# Patient Record
Sex: FEMALE | Race: WHITE
[De-identification: names, ages, dates, MRNs, and addresses within clinical notes are randomized per-mention and may not be internally consistent; named-entity substitution may affect disease eponyms.]

---

## 2018-10-22 ENCOUNTER — HOSPITAL ENCOUNTER (INPATIENT)
Dept: HOSPITAL 26 - MED | Age: 33
LOS: 1 days | Discharge: HOME | DRG: 194 | End: 2018-10-23
Attending: INTERNAL MEDICINE | Admitting: INTERNAL MEDICINE
Payer: COMMERCIAL

## 2018-10-22 VITALS
HEIGHT: 60 IN | DIASTOLIC BLOOD PRESSURE: 121 MMHG | WEIGHT: 112 LBS | BODY MASS INDEX: 21.99 KG/M2 | SYSTOLIC BLOOD PRESSURE: 201 MMHG

## 2018-10-22 VITALS — SYSTOLIC BLOOD PRESSURE: 123 MMHG | DIASTOLIC BLOOD PRESSURE: 78 MMHG

## 2018-10-22 VITALS — DIASTOLIC BLOOD PRESSURE: 113 MMHG | SYSTOLIC BLOOD PRESSURE: 159 MMHG

## 2018-10-22 VITALS — DIASTOLIC BLOOD PRESSURE: 122 MMHG | SYSTOLIC BLOOD PRESSURE: 189 MMHG

## 2018-10-22 VITALS — SYSTOLIC BLOOD PRESSURE: 189 MMHG | DIASTOLIC BLOOD PRESSURE: 112 MMHG

## 2018-10-22 DIAGNOSIS — I50.43: ICD-10-CM

## 2018-10-22 DIAGNOSIS — I07.1: ICD-10-CM

## 2018-10-22 DIAGNOSIS — Z98.51: ICD-10-CM

## 2018-10-22 DIAGNOSIS — I13.2: Primary | ICD-10-CM

## 2018-10-22 DIAGNOSIS — K21.9: ICD-10-CM

## 2018-10-22 DIAGNOSIS — E87.5: ICD-10-CM

## 2018-10-22 DIAGNOSIS — I16.0: ICD-10-CM

## 2018-10-22 DIAGNOSIS — I34.0: ICD-10-CM

## 2018-10-22 DIAGNOSIS — Z82.49: ICD-10-CM

## 2018-10-22 DIAGNOSIS — N18.6: ICD-10-CM

## 2018-10-22 DIAGNOSIS — D63.8: ICD-10-CM

## 2018-10-22 DIAGNOSIS — Z99.2: ICD-10-CM

## 2018-10-22 LAB
ALBUMIN FLD-MCNC: 3.9 G/DL (ref 3.4–5)
ANION GAP SERPL CALCULATED.3IONS-SCNC: 14.7 MMOL/L (ref 8–16)
AST SERPL-CCNC: 22 U/L (ref 15–37)
BASOPHILS # BLD AUTO: 0.1 K/UL (ref 0–0.22)
BASOPHILS NFR BLD AUTO: 1.5 % (ref 0–2)
BILIRUB SERPL-MCNC: 0.5 MG/DL (ref 0–1)
BUN SERPL-MCNC: 93 MG/DL (ref 7–18)
CHLORIDE SERPL-SCNC: 98 MMOL/L (ref 98–107)
CO2 SERPL-SCNC: 30.8 MMOL/L (ref 21–32)
CREAT SERPL-MCNC: 13.6 MG/DL (ref 0.6–1.3)
EOSINOPHIL # BLD AUTO: 0.2 K/UL (ref 0–0.4)
EOSINOPHIL NFR BLD AUTO: 2.2 % (ref 0–4)
ERYTHROCYTE [DISTWIDTH] IN BLOOD BY AUTOMATED COUNT: 16.2 % (ref 11.6–13.7)
GFR SERPL CREATININE-BSD FRML MDRD: 4 ML/MIN (ref 90–?)
GLUCOSE SERPL-MCNC: 94 MG/DL (ref 74–106)
HCT VFR BLD AUTO: 34 % (ref 36–48)
HGB BLD-MCNC: 11.1 G/DL (ref 12–16)
IRON SERPL-MCNC: 49 UG/DL (ref 35–150)
LYMPHOCYTES # BLD AUTO: 2 K/UL (ref 2.5–16.5)
LYMPHOCYTES NFR BLD AUTO: 28.8 % (ref 20.5–51.1)
MCH RBC QN AUTO: 31 PG (ref 27–31)
MCHC RBC AUTO-ENTMCNC: 33 G/DL (ref 33–37)
MCV RBC AUTO: 95.3 FL (ref 80–94)
MONOCYTES # BLD AUTO: 0.3 K/UL (ref 0.8–1)
MONOCYTES NFR BLD AUTO: 4.4 % (ref 1.7–9.3)
NEUTROPHILS # BLD AUTO: 4.4 K/UL (ref 1.8–7.7)
NEUTROPHILS NFR BLD AUTO: 63.1 % (ref 42.2–75.2)
PLATELET # BLD AUTO: 144 K/UL (ref 140–450)
POTASSIUM SERPL-SCNC: 5.5 MMOL/L (ref 3.5–5.1)
PROTHROMBIN TIME: 10.6 SECS (ref 10.8–13.4)
RBC # BLD AUTO: 3.57 MIL/UL (ref 4.2–5.4)
SODIUM SERPL-SCNC: 138 MMOL/L (ref 136–145)
TIBC SERPL-MCNC: 158 UG/DL (ref 250–450)
WBC # BLD AUTO: 7 K/UL (ref 4.8–10.8)

## 2018-10-22 RX ADMIN — SODIUM CHLORIDE PRN MG: 9 INJECTION, SOLUTION INTRAVENOUS at 11:39

## 2018-10-22 RX ADMIN — SODIUM CHLORIDE PRN MG: 9 INJECTION, SOLUTION INTRAVENOUS at 11:44

## 2018-10-22 NOTE — NUR
PATIENT CONTINUED ON HD. NO ACUTE DISTRESS NOTED.

-------------------------------------------------------------------------------

Addendum: 10/22/18 at 1928 by Gabino Dias RN

-------------------------------------------------------------------------------

BP NOTED 109/73. WNL.

## 2018-10-22 NOTE — NUR
PATIENT HAVING INCREASED AGITATION WITH ANXIETY. MEDICATED AS ORDERED WITH ATIVAN IVP. 
PATIENT BP TAKEN AT BEDSIDE 185/121, PER HD NURSE, OKAY TO GIVE ORDERED MEDICATION AT THIS 
TIME. MEDICATED AS ORDERED.

## 2018-10-22 NOTE — NUR
RECEIVED SBAR REPORT FROM ER NURSE AT PT BEDSIDE. PATIENT AMBULATORY TO BED. ALERT AND 
ORIENTED, Maltese SPEAKING. C/O HEADACHE AND CHEST PAIN, MEDICATED BY ER NURSE, PATIENT 
STATES REDUCED PAIN. PATIENT HAS HX ESRD, AV SHUNT LEFT ARM. ON ROOM AIR, NO ACUTE 
RESPIRATORY DISTRESS NOTED. BP ON ARRIVAL 189/112, PAGE SENT TO DR. BARTLETT TO CLARIFY PRN 
ORDERS. PATIENT ORIENTED TO HOSPITAL ENVIRONMENT. CALL LIGHT WITHIN REACH. UPDATED ON 
CURRENT PLAN OF CARE, IN AGREEMENT. IV SITE PATENT AND INTACT. TO F/U MEDICATIONS.

## 2018-10-22 NOTE — NUR
DR. BARTLETT MADE AWARE CURRENT /112, RECEIVED PRN HYDRALAZINE PO, MEDICATED AS ORDERED. 
TO FOLLOW.

## 2018-10-22 NOTE — NUR
33[facility] PT C/O 2/10 CHEST PAIN/HEAVINESS, X1 HR. WAS NOT ABLE TO 
HAVE HD THIS AM. PT WAS GIVEN 0.4MG NITRO SL AND 325MG ASPIRIN PO WITH RELIEF, 
NSR ON SCENE. PT REPORTS PRODUCTIVE COUGH. PT DENIES FEVER, SOB, N/V. LUNG 
SOUNDS CLEAR BL. BS ACTIVE X4, ABD SOFT FLAT NONTENDER. AOX4, AMBULATORY, RR 
EVEN AND UNLABORED. L AV FISTULA NOTED, BRUIT AND THRILL NOTED. PT REPORTS 
BEING ABLE TO MAKE URINE. 

HX HTN, ESRD (MWF)

-------------------------------------------------------------------------------

Addendum: 10/22/18 at 0516 by GLORIA

-------------------------------------------------------------------------------

PT REPORTS 9/10 HEADACHE

## 2018-10-22 NOTE — NUR
PATIENT HAS BEEN SCREENED AND CATEGORIZED AS MODERATE NUTRITION RISK. PATIENT WILL BE SEEN 
WITHIN 3-5 DAYS OF ADMISSION.



10/24/18  10/26/18



SAMM [address]ONT[address]

## 2018-10-22 NOTE — NUR
RECEIVED PT FROM DEANGELO DIANA RN PT [place]ao [place]ome[place] SPEAKER AAOX4 AMBULATORY  S/P HD 3,300 ML OUT ON 
TELEMETRY SR DENIES ANY PAIN AT THIS TIME INITIAL ASSESSMENT DONE

## 2018-10-22 NOTE — NUR
PATIENT SEEN BY DR. BARTLETT AT BEDSIDE. MD MADE AWARE OF PATIENT'S HEADACHES AND NAUSEA WITH 
VOMITING, UPDATED TO CURRENT /120. ORDERS RECEIVED IVP PRN BP MEDICATIONS, CT HEAD 
ORDERED. TO FOLLOW.

## 2018-10-22 NOTE — NUR
PATIENT SEEN BY DR. LIANG AT BEDSIDE. HD NURSE AT BEDSIDE. PATIENT TO HAVE HD. PER MD 
ORDERS, MEDICATE FOR ELEVATED BP DURING HD. TO FOLLOW.

## 2018-10-22 NOTE — NUR
CONTINUED ON HD, PATIENT RESTING IN BED. NO ACUTE DISTRESS NOTED. PATIENT HAVING BP WITHIN 
NORMAL LIMITS AT THIS TIME.

## 2018-10-22 NOTE — NUR
Patient will be admitted to care of DR. BARTLETT. Admited to TELE.  Will go to 
room 114. Belongings list completed.  Report to PAYAL BRIGHT.

## 2018-10-23 VITALS — SYSTOLIC BLOOD PRESSURE: 119 MMHG | DIASTOLIC BLOOD PRESSURE: 78 MMHG

## 2018-10-23 VITALS — SYSTOLIC BLOOD PRESSURE: 142 MMHG | DIASTOLIC BLOOD PRESSURE: 89 MMHG

## 2018-10-23 VITALS — SYSTOLIC BLOOD PRESSURE: 151 MMHG | DIASTOLIC BLOOD PRESSURE: 94 MMHG

## 2018-10-23 VITALS — SYSTOLIC BLOOD PRESSURE: 136 MMHG | DIASTOLIC BLOOD PRESSURE: 83 MMHG

## 2018-10-23 VITALS — SYSTOLIC BLOOD PRESSURE: 133 MMHG | DIASTOLIC BLOOD PRESSURE: 87 MMHG

## 2018-10-23 LAB
ALBUMIN FLD-MCNC: 3.7 G/DL (ref 3.4–5)
ANION GAP SERPL CALCULATED.3IONS-SCNC: 7.6 MMOL/L (ref 8–16)
AST SERPL-CCNC: 14 U/L (ref 15–37)
BASOPHILS # BLD AUTO: 0 K/UL (ref 0–0.22)
BASOPHILS NFR BLD AUTO: 0.4 % (ref 0–2)
BILIRUB SERPL-MCNC: 0.5 MG/DL (ref 0–1)
BUN SERPL-MCNC: 36 MG/DL (ref 7–18)
CHLORIDE SERPL-SCNC: 104 MMOL/L (ref 98–107)
CO2 SERPL-SCNC: 29 MMOL/L (ref 21–32)
CREAT SERPL-MCNC: 8.5 MG/DL (ref 0.6–1.3)
EOSINOPHIL # BLD AUTO: 0.1 K/UL (ref 0–0.4)
EOSINOPHIL NFR BLD AUTO: 0.7 % (ref 0–4)
ERYTHROCYTE [DISTWIDTH] IN BLOOD BY AUTOMATED COUNT: 16.2 % (ref 11.6–13.7)
GFR SERPL CREATININE-BSD FRML MDRD: 7 ML/MIN (ref 90–?)
GLUCOSE SERPL-MCNC: 95 MG/DL (ref 74–106)
HCT VFR BLD AUTO: 30.7 % (ref 36–48)
HGB BLD-MCNC: 10.2 G/DL (ref 12–16)
LYMPHOCYTES # BLD AUTO: 1.8 K/UL (ref 2.5–16.5)
LYMPHOCYTES NFR BLD AUTO: 21.3 % (ref 20.5–51.1)
MCH RBC QN AUTO: 32 PG (ref 27–31)
MCHC RBC AUTO-ENTMCNC: 33 G/DL (ref 33–37)
MCV RBC AUTO: 95.7 FL (ref 80–94)
MONOCYTES # BLD AUTO: 0.6 K/UL (ref 0.8–1)
MONOCYTES NFR BLD AUTO: 7.3 % (ref 1.7–9.3)
NEUTROPHILS # BLD AUTO: 5.8 K/UL (ref 1.8–7.7)
NEUTROPHILS NFR BLD AUTO: 70.3 % (ref 42.2–75.2)
PLATELET # BLD AUTO: 142 K/UL (ref 140–450)
POTASSIUM SERPL-SCNC: 4.6 MMOL/L (ref 3.5–5.1)
RBC # BLD AUTO: 3.21 MIL/UL (ref 4.2–5.4)
SODIUM SERPL-SCNC: 136 MMOL/L (ref 136–145)
WBC # BLD AUTO: 8.2 K/UL (ref 4.8–10.8)

## 2018-10-23 NOTE — NUR
PT HAS DISCHARGED. DISCHARGE DOCUMENTS, INSTRUCTIONS, AND PRESCRIPTION GIVEN. SIGNATURES 
OBTAINED. PT VERBALIZED UNDERSTANDING. IV SITE DC'D, WRIST BANDS REMOVED. PT LEFT IN A 
STABLE CONDITION WITH ALL HER BELONGINGS. HER  PICKED HER UP.

## 2018-10-23 NOTE — NUR
RECEIVED ORDER FOR HIGH RISK CLINIC VISIT WITH [facility].   I CALLED TAVO AT [facility] AND FAXED THE 
ORDER.   SHE SAID WE WERE TO MAKE THE APPOINTMENT.

## 2018-10-23 NOTE — NUR
PT REPORT RECEIVED FROM NIGHT SHIFT NURSE AT BEDSIDE. PT IS AWAKE AND ALERT, NO S/S OF 
DISTRESS NOTED. IV SITE NOTED ON R AC, 20 GAUGE, SALINE LOCK. DIALYSIS SHUNT NOTED ON L 
FOREARM. PT IS ON HD M, W, F. SKIN IS INTACT. PT IS ON ROOM AIR. CALL LIGHT WITHIN REACH, 
BED IS LOW. WILL CONTINUE TO MONITOR.

## 2018-10-25 NOTE — NUR
0877  PATIENT WAS CALLED ON PHONE PER REQUEST OF DCM BY HERMAN MOE WHO IS Japanese SPEAKING TO 
INFORM PATIENT THAT A PHYSICIAN APPOINTMENT HAS BEEN MADE FOR HER AT THE [facility] 
[facility] AT [address] [place] [address] [address] AND PROVIDED HER WITH PHONE NUMBER 
[phone] AND INFORMED HER THAT HER APPOINTMENT IS FOR TOMORROW FRIDAY 26TH AT 10:30 AM.  
PT ACKNOWLEDGED THE INFORMATION.

## 2018-12-19 ENCOUNTER — HOSPITAL ENCOUNTER (EMERGENCY)
Dept: HOSPITAL 26 - MED | Age: 33
Discharge: HOME | End: 2018-12-19
Payer: COMMERCIAL

## 2018-12-19 VITALS — SYSTOLIC BLOOD PRESSURE: 142 MMHG | DIASTOLIC BLOOD PRESSURE: 78 MMHG

## 2018-12-19 VITALS — WEIGHT: 115 LBS | HEIGHT: 60 IN | BODY MASS INDEX: 22.58 KG/M2

## 2018-12-19 VITALS — SYSTOLIC BLOOD PRESSURE: 231 MMHG | DIASTOLIC BLOOD PRESSURE: 123 MMHG

## 2018-12-19 DIAGNOSIS — N18.6: ICD-10-CM

## 2018-12-19 DIAGNOSIS — Z79.899: ICD-10-CM

## 2018-12-19 DIAGNOSIS — I12.0: Primary | ICD-10-CM

## 2018-12-19 PROCEDURE — 99284 EMERGENCY DEPT VISIT MOD MDM: CPT

## 2018-12-19 PROCEDURE — 81002 URINALYSIS NONAUTO W/O SCOPE: CPT

## 2018-12-19 PROCEDURE — 81025 URINE PREGNANCY TEST: CPT

## 2018-12-19 PROCEDURE — 70450 CT HEAD/BRAIN W/O DYE: CPT

## 2018-12-19 PROCEDURE — 96372 THER/PROPH/DIAG INJ SC/IM: CPT

## 2018-12-19 NOTE — NUR
PT PRESENTS TO ED WITH C/O HIGH BP WITH HEADACHE AND BLURRY VISION. PT REPORTS 
GRADUAL ONSET OVER 1 HR. PT REPORTS PAIN TO RIGHT AND POSTERIOR HEAD. PT PLACED 
INTO BED, PENDING MD CROUCH. ER MD AWARE OF BP.

## 2018-12-26 ENCOUNTER — HOSPITAL ENCOUNTER (INPATIENT)
Dept: HOSPITAL 26 - MED | Age: 33
LOS: 1 days | Discharge: HOME | DRG: 199 | End: 2018-12-27
Attending: INTERNAL MEDICINE | Admitting: INTERNAL MEDICINE
Payer: COMMERCIAL

## 2018-12-26 VITALS — DIASTOLIC BLOOD PRESSURE: 106 MMHG | SYSTOLIC BLOOD PRESSURE: 180 MMHG

## 2018-12-26 VITALS — SYSTOLIC BLOOD PRESSURE: 132 MMHG | DIASTOLIC BLOOD PRESSURE: 76 MMHG

## 2018-12-26 VITALS — SYSTOLIC BLOOD PRESSURE: 126 MMHG | DIASTOLIC BLOOD PRESSURE: 87 MMHG

## 2018-12-26 VITALS — DIASTOLIC BLOOD PRESSURE: 91 MMHG | SYSTOLIC BLOOD PRESSURE: 151 MMHG

## 2018-12-26 VITALS — BODY MASS INDEX: 23.16 KG/M2 | HEIGHT: 60 IN | WEIGHT: 118 LBS

## 2018-12-26 VITALS — SYSTOLIC BLOOD PRESSURE: 146 MMHG | DIASTOLIC BLOOD PRESSURE: 82 MMHG

## 2018-12-26 DIAGNOSIS — N18.6: ICD-10-CM

## 2018-12-26 DIAGNOSIS — N25.81: ICD-10-CM

## 2018-12-26 DIAGNOSIS — Z99.2: ICD-10-CM

## 2018-12-26 DIAGNOSIS — K05.10: ICD-10-CM

## 2018-12-26 DIAGNOSIS — E87.5: ICD-10-CM

## 2018-12-26 DIAGNOSIS — I16.0: Primary | ICD-10-CM

## 2018-12-26 DIAGNOSIS — I12.0: ICD-10-CM

## 2018-12-26 DIAGNOSIS — Z82.49: ICD-10-CM

## 2018-12-26 DIAGNOSIS — D63.1: ICD-10-CM

## 2018-12-26 LAB
ALBUMIN FLD-MCNC: 3.6 G/DL (ref 3.4–5)
AMYLASE SERPL-CCNC: 47 U/L (ref 25–115)
ANION GAP SERPL CALCULATED.3IONS-SCNC: 11.2 MMOL/L (ref 8–16)
AST SERPL-CCNC: 13 U/L (ref 15–37)
BASOPHILS # BLD AUTO: 0 K/UL (ref 0–0.22)
BASOPHILS NFR BLD AUTO: 1 % (ref 0–2)
BILIRUB SERPL-MCNC: 0.4 MG/DL (ref 0–1)
BUN SERPL-MCNC: 33 MG/DL (ref 7–18)
CHLORIDE SERPL-SCNC: 97 MMOL/L (ref 98–107)
CO2 SERPL-SCNC: 33 MMOL/L (ref 21–32)
CREAT SERPL-MCNC: 5.4 MG/DL (ref 0.6–1.3)
EOSINOPHIL # BLD AUTO: 0.1 K/UL (ref 0–0.4)
EOSINOPHIL NFR BLD AUTO: 1.8 % (ref 0–4)
ERYTHROCYTE [DISTWIDTH] IN BLOOD BY AUTOMATED COUNT: 15.1 % (ref 11.6–13.7)
GFR SERPL CREATININE-BSD FRML MDRD: 12 ML/MIN (ref 90–?)
GLUCOSE SERPL-MCNC: 100 MG/DL (ref 74–106)
HCT VFR BLD AUTO: 31.9 % (ref 36–48)
HGB BLD-MCNC: 10.5 G/DL (ref 12–16)
LIPASE SERPL-CCNC: 156 U/L (ref 73–393)
LYMPHOCYTES # BLD AUTO: 1.2 K/UL (ref 2.5–16.5)
LYMPHOCYTES NFR BLD AUTO: 25 % (ref 20.5–51.1)
MCH RBC QN AUTO: 30 PG (ref 27–31)
MCHC RBC AUTO-ENTMCNC: 33 G/DL (ref 33–37)
MCV RBC AUTO: 92.2 FL (ref 80–94)
MONOCYTES # BLD AUTO: 0.3 K/UL (ref 0.8–1)
MONOCYTES NFR BLD AUTO: 6.3 % (ref 1.7–9.3)
NEUTROPHILS # BLD AUTO: 3.2 K/UL (ref 1.8–7.7)
NEUTROPHILS NFR BLD AUTO: 65.9 % (ref 42.2–75.2)
PLATELET # BLD AUTO: 143 K/UL (ref 140–450)
POTASSIUM SERPL-SCNC: 4.2 MMOL/L (ref 3.5–5.1)
PROTHROMBIN TIME: 9.8 SECS (ref 10.8–13.4)
RBC # BLD AUTO: 3.46 MIL/UL (ref 4.2–5.4)
SODIUM SERPL-SCNC: 137 MMOL/L (ref 136–145)
WBC # BLD AUTO: 4.9 K/UL (ref 4.8–10.8)

## 2018-12-26 NOTE — NUR
RECEIVED BEDSIDE REPORT FROM ER NURSE. PATIENT AAOX4. PATIENT Jamaican SPEAKING. NO SIGNS OF 
RESPIRATORY DISTRESS, ON RA. PATIENT AMBULATORY, IS ABLE TO AMBULATE TO BATHROOM. SKIN 
INTACT. IV ON R FA 20 G, SALINE LOCK, CLEAN DRY AND INTACT. PATIENT W L AV FISTULA ACCESS, 
COVERED WITH DRESSING. L ARM RESTRICTION SIGN POSTED. STRICT I&O SIGN POSTED. BED IN LOW 
POSITION, CALL LIGHT WITHIN REACH. WILL CONTINUE TO MONITOR.

## 2018-12-26 NOTE — NUR
Patient will be admitted to care of . Admited to TELE FLOOR.  Will go to 
room 105-A. Belongings list completed.  Report to PAYAL VELA.

## 2018-12-26 NOTE — NUR
VITAL SIGNS ARE WITHIN NORMAL LIMITS. B/P ELEVATED 146/82 AND 71BPM. DUE MEDICATION GIVEN 
FOR B/P. PT TOLERATED WELL. WILL CONTINUE TO MONITOR.

## 2018-12-26 NOTE — NUR
33Y/F BIB EMS FROM A [facility]IALYSIS C[facility] WITH C/O HEAD AHCE 8/10. GIVEN CLONIDINE X 
2 AT THE FACILITY; /106, SPO2 100% ON 2LPM N/C; AV FISTULA LT ARM, IV 20G 
RT AC PLACE BY EMS; AAO X4, ANSWER QUESTIONS APPROPRIATELY. PT BED DOWN, 
BEDRAIL UP X 1, ER MD AWARE AND NOTIFIED OF PT STATUS.



HX; HTN, DM

RX; CLONIDINE, AMLOPIDINE, PHOSLO, LOSARTAN

## 2018-12-26 NOTE — NUR
RECEIVED REPORT FROM DAY SHIFT RN. PT IS A&OX4. RESPIRATIONS ARE EQUAL AND UNLABORED. DENIES 
ANY SOB OR PAIN. PT WITH LEFT AV FISTULA ACCESS HAD DIALYSIS TODAY NOT IN [facility]. PATIENT RECEIVES HEMODIALYSIS ON MONDAYS,WEDNESDAYS, AND FRIDAYS. PER  NO NEED 
FOR DIALYSIS TOMORROW. LEFT ARM RESTRICTION SIGN IN ROOM ALSO PINK RESTRICTION ARM BAND 
APPLIED. STRICT I&O SIGN IN ROOM. PT WITH SALINE LOCK ON R FA 20G. IV IS PATENT AND CLEAN. 
SKIN INTACT. PLAN OF CARE DISCUSSED WITH PT. ALL SAFETY MEASURES ARE IN PLACE.

## 2018-12-27 VITALS — SYSTOLIC BLOOD PRESSURE: 126 MMHG | DIASTOLIC BLOOD PRESSURE: 77 MMHG

## 2018-12-27 VITALS — DIASTOLIC BLOOD PRESSURE: 71 MMHG | SYSTOLIC BLOOD PRESSURE: 122 MMHG

## 2018-12-27 VITALS — SYSTOLIC BLOOD PRESSURE: 130 MMHG | DIASTOLIC BLOOD PRESSURE: 80 MMHG

## 2018-12-27 VITALS — DIASTOLIC BLOOD PRESSURE: 65 MMHG | SYSTOLIC BLOOD PRESSURE: 120 MMHG

## 2018-12-27 VITALS — DIASTOLIC BLOOD PRESSURE: 79 MMHG | SYSTOLIC BLOOD PRESSURE: 136 MMHG

## 2018-12-27 LAB
ANION GAP SERPL CALCULATED.3IONS-SCNC: 14.6 MMOL/L (ref 8–16)
BASOPHILS # BLD AUTO: 0 K/UL (ref 0–0.22)
BASOPHILS NFR BLD AUTO: 0.7 % (ref 0–2)
BUN SERPL-MCNC: 58 MG/DL (ref 7–18)
CHLORIDE SERPL-SCNC: 92 MMOL/L (ref 98–107)
CO2 SERPL-SCNC: 30.6 MMOL/L (ref 21–32)
CREAT SERPL-MCNC: 8.7 MG/DL (ref 0.6–1.3)
EOSINOPHIL # BLD AUTO: 0.1 K/UL (ref 0–0.4)
EOSINOPHIL NFR BLD AUTO: 1.4 % (ref 0–4)
ERYTHROCYTE [DISTWIDTH] IN BLOOD BY AUTOMATED COUNT: 15.2 % (ref 11.6–13.7)
GFR SERPL CREATININE-BSD FRML MDRD: 7 ML/MIN (ref 90–?)
GLUCOSE SERPL-MCNC: 82 MG/DL (ref 74–106)
HCT VFR BLD AUTO: 32.4 % (ref 36–48)
HGB BLD-MCNC: 10.6 G/DL (ref 12–16)
LYMPHOCYTES # BLD AUTO: 1.9 K/UL (ref 2.5–16.5)
LYMPHOCYTES NFR BLD AUTO: 33.1 % (ref 20.5–51.1)
MAGNESIUM SERPL-MCNC: 2.3 MG/DL (ref 1.8–2.4)
MCH RBC QN AUTO: 31 PG (ref 27–31)
MCHC RBC AUTO-ENTMCNC: 33 G/DL (ref 33–37)
MCV RBC AUTO: 93.7 FL (ref 80–94)
MONOCYTES # BLD AUTO: 0.5 K/UL (ref 0.8–1)
MONOCYTES NFR BLD AUTO: 8.8 % (ref 1.7–9.3)
NEUTROPHILS # BLD AUTO: 3.1 K/UL (ref 1.8–7.7)
NEUTROPHILS NFR BLD AUTO: 56 % (ref 42.2–75.2)
PHOSPHATE SERPL-MCNC: 6.4 MG/DL (ref 2.5–4.9)
PLATELET # BLD AUTO: 148 K/UL (ref 140–450)
POTASSIUM SERPL-SCNC: 6.2 MMOL/L (ref 3.5–5.1)
RBC # BLD AUTO: 3.46 MIL/UL (ref 4.2–5.4)
SODIUM SERPL-SCNC: 131 MMOL/L (ref 136–145)
WBC # BLD AUTO: 5.6 K/UL (ref 4.8–10.8)

## 2018-12-27 RX ADMIN — ACETAMINOPHEN PRN MG: 325 TABLET ORAL at 08:04

## 2018-12-27 RX ADMIN — ACETAMINOPHEN PRN MG: 325 TABLET ORAL at 00:17

## 2018-12-27 NOTE — NUR
SPOKE TO ASHLY, DIALYSIS NURSE ABOUT STAT HD ORDER TODAY PER DR CRAMER. THEY SAID THEY WILL BE 
HERE AS SOON AS POSSIBLE. DR CRAMER WILL GIVE THEM THE DIALYSIS ORDER.

## 2018-12-27 NOTE — NUR
RECEIVED BEDSIDE REPORT FROM NIGHT SHIFT NURSE. PATIENT IS AWAKE, ALERT AND ORIENTEDX4. NO 
SIGNS OF DISTRESS ON RA. SKIN IS INTACT. IV ON L FA 20G SL. CLEAN,DRY AND INTACT. DIALYSIS 
AV SHUNT ON L ARM, NO B/P OR VENIPUNCTURE ON L ARM SIGNS POSTED, RESTRICTED ARM BAND IN 
PLACE. PATIENT IS AMBULATORY, CONTINENT. NO COMPLAINTS AT THIS TIME. BED IN LOW POSITION. 
CALL LIGHT WITHIN REACH. WILL CONTINUE TO MONITOR THE PATIENT.

## 2018-12-27 NOTE — NUR
HD NURSE AT BEDSIDE. HD ORDERS ARE IN. PATIENT IN NO SIGNS OF DISTRESS. WILL CONTINUE TO 
MONITOR THE PATIENT

## 2018-12-27 NOTE — NUR
PATIENT HAS BEEN SCREENED AND CATEGORIZED AS MODERATE NUTRITION RISK. PATIENT WILL BE SEEN 
WITHIN 3-5 DAYS OF ADMISSION.



12/28/18 12/30/18



SAMM MONTEMAY[address]

## 2018-12-27 NOTE — NUR
VITALS ARE STABLE AFTER DIALYSIS 2L OUT. WILL ADMINISTER ANTIBIOTICS NOW. POTASSIUM LAB 
ORDER IN ONE HOUR. WILL CONTINUE TO MONITOR THE PATIENT

## 2018-12-27 NOTE — NUR
EDUCATED PATIENT ON DISCHARGE INSTRUCTIONS. EDUCATED ON DISEASE PROCESS, ABN S/SX AND WHEN 
TO GO TO THE ER, EDUCATED ON MEDS, GAVE PRESCRIPTION, PATIENT HAS PNA AND FLU VACCINE UP TO 
DATE. EDUCATED ON F/U W PCP AND NEPHROLOGIST AND TO GET SCHEDULED DIALYSIS TOMORROW. REMOVED 
IV, TIP INTACT. PATIENT TO CHANGE AND WILL WAIT FOR RIDE HOME.

## 2018-12-27 NOTE — NUR
VITAL SIGNS ARE WITHIN NORMAL LIMITS. PT COMPLAIN OF HEADACHE. TYLENOL WAS GIVEN. WILL 
CONTINUE TO MONITOR.

## 2018-12-27 NOTE — NUR
VITAL SIGNS ARE WITHIN NORMAL LIMITS. PT DENIES ANY PAIN. ALL NEEDS MET AT THIS TIME. CALL 
LIGHT WITHIN REACH.

## 2019-01-07 ENCOUNTER — HOSPITAL ENCOUNTER (EMERGENCY)
Dept: HOSPITAL 26 - MED | Age: 34
Discharge: HOME | End: 2019-01-07
Payer: COMMERCIAL

## 2019-01-07 VITALS — DIASTOLIC BLOOD PRESSURE: 100 MMHG | SYSTOLIC BLOOD PRESSURE: 175 MMHG

## 2019-01-07 VITALS — BODY MASS INDEX: 24.6 KG/M2 | WEIGHT: 122 LBS | HEIGHT: 59 IN

## 2019-01-07 VITALS — SYSTOLIC BLOOD PRESSURE: 162 MMHG | DIASTOLIC BLOOD PRESSURE: 98 MMHG

## 2019-01-07 DIAGNOSIS — Z79.2: ICD-10-CM

## 2019-01-07 DIAGNOSIS — Z79.899: ICD-10-CM

## 2019-01-07 DIAGNOSIS — R51: Primary | ICD-10-CM

## 2019-01-07 DIAGNOSIS — F41.9: ICD-10-CM

## 2019-01-07 DIAGNOSIS — I10: ICD-10-CM

## 2019-01-07 PROCEDURE — 99283 EMERGENCY DEPT VISIT LOW MDM: CPT

## 2019-01-07 PROCEDURE — 96372 THER/PROPH/DIAG INJ SC/IM: CPT

## 2019-01-07 NOTE — NUR
PT C/O HTN, HEADACHE, AND ANXIETY. PT /96, STATES SHE HAS A THROBBING 
HEADACHE AT 6/10, HAS DIZZYNESS AND NAUSEA. PT DENIES CP, BLURRY VISISON, OR 
SOB AT THIS TIME. PT HAS HEMODIALYSIS Q MONDAY, TUESDAY, WEDNESDAY, AND 
THURSDAY. ER MD TO SEE PT. SIDE RAILS UP, BED IN LOWEST POSITION, HEAD OF BED 
ELEVATED. WILL CONTINUE TO MONITOR. 



MEDHX: HTN, RENAL DISEASE, HEMODIALYSIS, ANXIETY

RX: CLONIDINE, AMLODIPNE, AMOXACILLIN

## 2019-01-07 NOTE — NUR
Patient discharged with v/s stable. Written and verbal after care instructions 
given and explained. Patient alert, oriented and verbalized understanding of 
instructions. Ambulatory with steady gait. All questions addressed prior to 
discharge. ID band removed. Patient advised to follow up with PMD. Rx of 
motrin, atarax, and norco given. Patient educated on indication of medication 
including possible reaction and side effects. Opportunity to ask questions 
provided and answered.

## 2019-01-10 ENCOUNTER — HOSPITAL ENCOUNTER (INPATIENT)
Dept: HOSPITAL 26 - MED | Age: 34
LOS: 2 days | Discharge: HOME | DRG: 425 | End: 2019-01-12
Attending: INTERNAL MEDICINE | Admitting: INTERNAL MEDICINE
Payer: COMMERCIAL

## 2019-01-10 VITALS — WEIGHT: 107 LBS | HEIGHT: 60 IN | BODY MASS INDEX: 21.01 KG/M2

## 2019-01-10 VITALS — SYSTOLIC BLOOD PRESSURE: 193 MMHG | DIASTOLIC BLOOD PRESSURE: 100 MMHG

## 2019-01-10 DIAGNOSIS — R04.0: ICD-10-CM

## 2019-01-10 DIAGNOSIS — E87.5: Primary | ICD-10-CM

## 2019-01-10 DIAGNOSIS — I16.0: ICD-10-CM

## 2019-01-10 DIAGNOSIS — I12.0: ICD-10-CM

## 2019-01-10 DIAGNOSIS — E78.5: ICD-10-CM

## 2019-01-10 DIAGNOSIS — Z79.899: ICD-10-CM

## 2019-01-10 DIAGNOSIS — Z99.2: ICD-10-CM

## 2019-01-10 DIAGNOSIS — D64.9: ICD-10-CM

## 2019-01-10 DIAGNOSIS — N18.6: ICD-10-CM

## 2019-01-10 LAB
ALBUMIN FLD-MCNC: 3.5 G/DL (ref 3.4–5)
ANION GAP SERPL CALCULATED.3IONS-SCNC: 15.2 MMOL/L (ref 8–16)
AST SERPL-CCNC: 11 U/L (ref 15–37)
BASOPHILS # BLD AUTO: 0 K/UL (ref 0–0.22)
BASOPHILS NFR BLD AUTO: 0.3 % (ref 0–2)
BILIRUB SERPL-MCNC: 0.3 MG/DL (ref 0–1)
BUN SERPL-MCNC: 74 MG/DL (ref 7–18)
CHLORIDE SERPL-SCNC: 95 MMOL/L (ref 98–107)
CO2 SERPL-SCNC: 31.3 MMOL/L (ref 21–32)
CREAT SERPL-MCNC: 10.2 MG/DL (ref 0.6–1.3)
EOSINOPHIL # BLD AUTO: 0.2 K/UL (ref 0–0.4)
EOSINOPHIL NFR BLD AUTO: 2.4 % (ref 0–4)
ERYTHROCYTE [DISTWIDTH] IN BLOOD BY AUTOMATED COUNT: 15.3 % (ref 11.6–13.7)
GFR SERPL CREATININE-BSD FRML MDRD: 6 ML/MIN (ref 90–?)
GLUCOSE SERPL-MCNC: 98 MG/DL (ref 74–106)
HCT VFR BLD AUTO: 35.1 % (ref 36–48)
HGB BLD-MCNC: 11.2 G/DL (ref 12–16)
LIPASE SERPL-CCNC: 241 U/L (ref 73–393)
LYMPHOCYTES # BLD AUTO: 2.4 K/UL (ref 2.5–16.5)
LYMPHOCYTES NFR BLD AUTO: 30.8 % (ref 20.5–51.1)
MCH RBC QN AUTO: 30 PG (ref 27–31)
MCHC RBC AUTO-ENTMCNC: 32 G/DL (ref 33–37)
MCV RBC AUTO: 92.9 FL (ref 80–94)
MONOCYTES # BLD AUTO: 0.4 K/UL (ref 0.8–1)
MONOCYTES NFR BLD AUTO: 5.2 % (ref 1.7–9.3)
NEUTROPHILS # BLD AUTO: 4.8 K/UL (ref 1.8–7.7)
NEUTROPHILS NFR BLD AUTO: 61.3 % (ref 42.2–75.2)
PLATELET # BLD AUTO: 183 K/UL (ref 140–450)
POTASSIUM SERPL-SCNC: 5.5 MMOL/L (ref 3.5–5.1)
PROTHROMBIN TIME: 9.6 SECS (ref 10.8–13.4)
RBC # BLD AUTO: 3.78 MIL/UL (ref 4.2–5.4)
SODIUM SERPL-SCNC: 136 MMOL/L (ref 136–145)
WBC # BLD AUTO: 7.8 K/UL (ref 4.8–10.8)

## 2019-01-10 NOTE — NUR
33/F PRESENTS TO ED, C/O EPISTAXIS, STARTED 2 HRS AGO. PT WITH NOSE CLAMP AT 
THIS TIME, PLACED IN TRIAGE. PT REPORTED THAT SHE ALSO HAD EPITAXIS THAT 
RESOLVED IN 30 MINS LAST NIGHT AFTER HD. PT REPORTS 3/10 HEADACHE AT THIS TIME. 
PT AOX4, GCS 15, RR EVEN AND UNLABORED. LUNG SOUNDS CLEAR BL. L FA AV SHUNT 
NOTED, +BRUIT, +THRILL. 

HX HTN, ESRD (HD ON MWF)

## 2019-01-10 NOTE — NUR
PT /120, HR 98, PT REPORTS VOMITING AFTER CLONIDINE PO. PT REPORTS L 
SIDED CHEST HEAVINESS, NOT PAIN. ER MD MADE AWARE

## 2019-01-10 NOTE — NUR
PT C/O SUDDEN ONSET L SIDED CP, RADIATING TO L SHOULDER. PT RESTLESS WITH 
FACIAL GRIMACING. ER MD MADE AWARE. EMT AT BEDSIDE FOR EKG.

## 2019-01-11 VITALS — SYSTOLIC BLOOD PRESSURE: 167 MMHG | DIASTOLIC BLOOD PRESSURE: 98 MMHG

## 2019-01-11 VITALS — DIASTOLIC BLOOD PRESSURE: 108 MMHG | SYSTOLIC BLOOD PRESSURE: 180 MMHG

## 2019-01-11 VITALS — DIASTOLIC BLOOD PRESSURE: 97 MMHG | SYSTOLIC BLOOD PRESSURE: 158 MMHG

## 2019-01-11 VITALS — SYSTOLIC BLOOD PRESSURE: 140 MMHG | DIASTOLIC BLOOD PRESSURE: 83 MMHG

## 2019-01-11 VITALS — SYSTOLIC BLOOD PRESSURE: 172 MMHG | DIASTOLIC BLOOD PRESSURE: 104 MMHG

## 2019-01-11 VITALS — DIASTOLIC BLOOD PRESSURE: 99 MMHG | SYSTOLIC BLOOD PRESSURE: 171 MMHG

## 2019-01-11 VITALS — SYSTOLIC BLOOD PRESSURE: 180 MMHG | DIASTOLIC BLOOD PRESSURE: 108 MMHG

## 2019-01-11 VITALS — DIASTOLIC BLOOD PRESSURE: 98 MMHG | SYSTOLIC BLOOD PRESSURE: 151 MMHG

## 2019-01-11 VITALS — SYSTOLIC BLOOD PRESSURE: 160 MMHG | DIASTOLIC BLOOD PRESSURE: 94 MMHG

## 2019-01-11 LAB
IRON SERPL-MCNC: 44 UG/DL (ref 35–150)
TIBC SERPL-MCNC: 144 UG/DL (ref 250–450)

## 2019-01-11 PROCEDURE — 5A1D70Z PERFORMANCE OF URINARY FILTRATION, INTERMITTENT, LESS THAN 6 HOURS PER DAY: ICD-10-PCS | Performed by: INTERNAL MEDICINE

## 2019-01-11 RX ADMIN — LORATADINE SCH MG: 10 TABLET ORAL at 08:14

## 2019-01-11 NOTE — NUR
SCHEDULED MEDICATION GIVEN AND TOLERATED WELL. PT REFUSED HEPARIN STATING "I HAD A NOSE  
BLEED AND WAS TOLD THAT I WOULDN'T BE GETTING HEPARIN BECAUSE OF IT." VERIFIED THIS WITH 
SATISH-RN DAY SHIFT NURSE THAT HEPARIN WAS NOT GIVEN. NO S/S OF RESPIRATORY DISTRESS OR 
DISCOMFORT NOTED AT THIS TIME. WILL CONTINUE TO MONITOR.

## 2019-01-11 NOTE — NUR
CM NOTE



PER CELIA # [phone], THE PATIENT IS SCHEDULED FOR OUTPATIENT FOLLOW UP APPOINTMENT 
WITH DR. JUAN LARIOS (WHO IS COVERING FOR PATIENT'S PCP DR. POOJA NEWMAN WHO IS ON 
VACATION) ON JANUARY 17, 2019 AT 10:30AM, CLINIC AT [address] [place], [place] [id number].  I GAVE THE PATIENT A COPY OF HER OUTPATIENT FOLLOW UP SCHEDULE.

## 2019-01-11 NOTE — NUR
PT IS AWAKE AND SEATED ON THE BED, DIALYSIS IS STILL GOING, DIALYSIS NURSE INFORMED PAYAL COVARRUBIAS ABOUT THE PT'S BP /97 AND PULSE IS 95, HYDRALAZINE WAS GIVEN TO PT AND PT 
TOLERATED IT, WILL RE-ASSESS BP.

## 2019-01-11 NOTE — NUR
PT IS AWAKE AND VERBALIZED A PAIN RATE OF 8/10 FOR THE HEADACHE AND ASKED FOR A TYLENOL. 
WILL MONITOR PT.

## 2019-01-11 NOTE — NUR
RECEIVED REPORT FROM ER NURSE DEDRA AT BEDSIDE FOR CONTINUITY OF CARE. PT IS AMBULATORY. NO 
SOB NO S/S OF DISTRESS ON RA. IV NOTED RAC 18G, HAS LFA SHUNT (NO BP OR IV ACCESS). BED 
LOWERED PT ORIENTED TO ROOM. WILL CONTINUE TO MONITOR.CALL LIGHT WITHIN REACH WILL CONTINUE 
TO MONITOR.

## 2019-01-11 NOTE — NUR
Patient will be admitted to care of DR. LUCIANO. Admited to TELE. Will go to 
room 106B. Belongings list completed.  Report to PAYAL TRIPP.

## 2019-01-11 NOTE — NUR
DIALYSIS WAS FINISHED NOW AND 2L OUTPUT WAS TAKEN, VITAL SIGNS TAKEN AND PT' BP /94, 
PULSE IS 89, RESPIRATION AT 18 AND O2 SATURATION IS 98%. PT IS STABLE AND DENIES PAIN AT 
THIS TIME.

## 2019-01-11 NOTE — NUR
RECEIVED PT FROM NIGHT SHIFT NURSE, PT IS AWAKE AND LYING ON THE BED WITH SIDE RAILS UP AND 
CALL LIGHT WITHIN REACH, PT HAS A RESTRICTED EXTREMITY ON THE LEFT, WITH AN AV SHUNT IN 
PLACE, PT HAS AN IV LINE ON THE RT AC G.18 ON SALINE LOCK, PT VERBALIZED A HEADACHE OF A 
PAIN RATE OF 6/10 AND REQUESTED TO BE GIVEN TYLENOL. WILL MEDICATE PT.

## 2019-01-11 NOTE — NUR
RECEIVED REPORT FROM DAY SHIFT NURSE SATISH-RN AT BEDSIDE. PT AOX4, ON ROOM AIR WITH RIGHT 
AC #18G-SL. LEFT FA AV SHUNT- RESTRICTED EXTREMITY- DIALYSIS ON MWF WITH 2L OUTPUT TODAY. 
DISCUSSED PLAN OF CARE AND PT VERBALIZED UNDERSTANDING. NO S/S OF RESPIRATORY DISTRESS OR 
DISCOMFORT NOTED AT THIS TIME. BED IN LOWEST POSITION, BED BREAKS ON, BOTH SIDE RAILS UP. 
WILL CONTINUE TO MONITOR.

## 2019-01-11 NOTE — NUR
PT HAS ELEVATED /104  WILL ADMIN APRESOLINE FOR BP. AND PT STATED SHE HAS A 
REACTION AND PUFFY NESS OF EYES WILL CALL MD.

## 2019-01-11 NOTE — NUR
PT STATED SHE STARTED TO GET A HEADACHE- SYMPTOMS OF HIGH BLOOD PRESSURE. 161-101. SPOKE 
WITH CHARGE NURSE NENITA AND SHE STATED IT WAS OK TO GIVE CATAPRES. MEDICATION GIVEN AND 
TOLERATED WELL. NO S/S OF RESPIRATORY DISTRESS OR DISCOMFORT NOTED AT THIS TIME. WILL 
CONTINUE TO MONITOR.

## 2019-01-11 NOTE — NUR
PATIENT HAS BEEN SCREENED AND CATEGORIZED AS MODERATE NUTRITION RISK. PATIENT WILL BE SEEN 
WITHIN 3-5 DAYS OF ADMISSION.



01/13/19  01/15/19



SAMM [address]ONT[address]

## 2019-01-12 VITALS — DIASTOLIC BLOOD PRESSURE: 102 MMHG | SYSTOLIC BLOOD PRESSURE: 144 MMHG

## 2019-01-12 VITALS — SYSTOLIC BLOOD PRESSURE: 163 MMHG | DIASTOLIC BLOOD PRESSURE: 94 MMHG

## 2019-01-12 VITALS — DIASTOLIC BLOOD PRESSURE: 90 MMHG | SYSTOLIC BLOOD PRESSURE: 155 MMHG

## 2019-01-12 VITALS — DIASTOLIC BLOOD PRESSURE: 98 MMHG | SYSTOLIC BLOOD PRESSURE: 163 MMHG

## 2019-01-12 LAB
ALBUMIN FLD-MCNC: 3.1 G/DL (ref 3.4–5)
ANION GAP SERPL CALCULATED.3IONS-SCNC: 15.6 MMOL/L (ref 8–16)
AST SERPL-CCNC: 11 U/L (ref 15–37)
BASOPHILS # BLD AUTO: 0 K/UL (ref 0–0.22)
BASOPHILS NFR BLD AUTO: 0.7 % (ref 0–2)
BILIRUB SERPL-MCNC: 0.4 MG/DL (ref 0–1)
BUN SERPL-MCNC: 49 MG/DL (ref 7–18)
CHLORIDE SERPL-SCNC: 98 MMOL/L (ref 98–107)
CO2 SERPL-SCNC: 30 MMOL/L (ref 21–32)
CREAT SERPL-MCNC: 7.5 MG/DL (ref 0.6–1.3)
EOSINOPHIL # BLD AUTO: 0.2 K/UL (ref 0–0.4)
EOSINOPHIL NFR BLD AUTO: 3.6 % (ref 0–4)
ERYTHROCYTE [DISTWIDTH] IN BLOOD BY AUTOMATED COUNT: 15.7 % (ref 11.6–13.7)
GFR SERPL CREATININE-BSD FRML MDRD: 8 ML/MIN (ref 90–?)
GLUCOSE SERPL-MCNC: 83 MG/DL (ref 74–106)
HCT VFR BLD AUTO: 32.9 % (ref 36–48)
HGB BLD-MCNC: 10.3 G/DL (ref 12–16)
LYMPHOCYTES # BLD AUTO: 2.5 K/UL (ref 2.5–16.5)
LYMPHOCYTES NFR BLD AUTO: 38.7 % (ref 20.5–51.1)
MCH RBC QN AUTO: 30 PG (ref 27–31)
MCHC RBC AUTO-ENTMCNC: 32 G/DL (ref 33–37)
MCV RBC AUTO: 94.8 FL (ref 80–94)
MONOCYTES # BLD AUTO: 0.4 K/UL (ref 0.8–1)
MONOCYTES NFR BLD AUTO: 5.9 % (ref 1.7–9.3)
NEUTROPHILS # BLD AUTO: 3.3 K/UL (ref 1.8–7.7)
NEUTROPHILS NFR BLD AUTO: 51.1 % (ref 42.2–75.2)
PLATELET # BLD AUTO: 194 K/UL (ref 140–450)
POTASSIUM SERPL-SCNC: 4.6 MMOL/L (ref 3.5–5.1)
RBC # BLD AUTO: 3.47 MIL/UL (ref 4.2–5.4)
SODIUM SERPL-SCNC: 139 MMOL/L (ref 136–145)
WBC # BLD AUTO: 6.5 K/UL (ref 4.8–10.8)

## 2019-01-12 RX ADMIN — LORATADINE SCH MG: 10 TABLET ORAL at 08:10

## 2019-01-12 NOTE — NUR
Asked pt re: PNA & FLU vaccine record. Pt states she doesn't have it, but states she will 
f/u with her dialysis center & get the vaccines from there.

## 2019-01-12 NOTE — NUR
/92. APRESOLINE GIVEN. PT TOLERATED WELL. NO S/S OF RESPIRATORY DISTRESS OR DISCOMFORT 
NOTED AT THIS TIME. WILL CONTINUE TO MONITOR.

## 2019-01-12 NOTE — NUR
Pt discharged at this time to home, accompanied by . Name band removed. Amb off unit 
with steady gait. All belongings with pt upon departure.

## 2019-01-12 NOTE — NUR
PT SLEEPING IN BED. NO S/S OF RESPIRATORY DISTRESS OR DISCOMFORT NOTED AT THIS TIME. WILL 
CONTINUE TO MONITOR.

## 2019-01-12 NOTE — NUR
Written & verbal discharge instructions provided to pt. Pt verbalized understanding. Right 
ac IV discontinued. Pt able to change into personal clothes independently with good safety 
awareness. Per pt, her  will pick her up in a few minutes.

## 2019-01-12 NOTE — NUR
Received report from pm nurse Natacha. Pt awake, aaox4, FLACC 0, able to verbalize needs. Call 
light within reach. Right ac IV saline lock intact & asymptomatic.

## 2019-01-12 NOTE — NUR
VITAL SIGNS TAKEN AND TOLERATED WELL. /94, HR 85. WILL RE-ASSESS. NO S/S OF 
RESPIRATORY DISTRESS OR DISCOMFORT NOTED AT THIS TIME. WILL CONTINUE TO MONITOR.

## 2019-03-17 ENCOUNTER — HOSPITAL ENCOUNTER (EMERGENCY)
Dept: HOSPITAL 26 - MED | Age: 34
LOS: 1 days | Discharge: LEFT BEFORE DISCHARGE | End: 2019-03-18
Payer: COMMERCIAL

## 2019-03-17 VITALS — HEIGHT: 59 IN | WEIGHT: 110 LBS | BODY MASS INDEX: 22.18 KG/M2

## 2019-03-17 VITALS — DIASTOLIC BLOOD PRESSURE: 117 MMHG | SYSTOLIC BLOOD PRESSURE: 186 MMHG

## 2019-03-17 DIAGNOSIS — R07.89: Primary | ICD-10-CM

## 2019-03-17 DIAGNOSIS — I12.0: ICD-10-CM

## 2019-03-17 DIAGNOSIS — R06.02: ICD-10-CM

## 2019-03-17 DIAGNOSIS — N18.6: ICD-10-CM

## 2019-03-17 DIAGNOSIS — Z99.2: ICD-10-CM

## 2019-05-29 ENCOUNTER — HOSPITAL ENCOUNTER (EMERGENCY)
Dept: HOSPITAL 26 - MED | Age: 34
Discharge: LEFT BEFORE DISCHARGE | End: 2019-05-29
Payer: COMMERCIAL

## 2019-05-29 VITALS — DIASTOLIC BLOOD PRESSURE: 106 MMHG | SYSTOLIC BLOOD PRESSURE: 186 MMHG

## 2019-05-29 VITALS — WEIGHT: 123 LBS | BODY MASS INDEX: 24.15 KG/M2 | HEIGHT: 60 IN

## 2019-05-29 VITALS — SYSTOLIC BLOOD PRESSURE: 186 MMHG | DIASTOLIC BLOOD PRESSURE: 106 MMHG

## 2019-05-29 DIAGNOSIS — R51: ICD-10-CM

## 2019-05-29 DIAGNOSIS — Z53.21: ICD-10-CM

## 2019-05-29 DIAGNOSIS — R10.13: ICD-10-CM

## 2019-05-29 DIAGNOSIS — I10: ICD-10-CM

## 2019-05-29 DIAGNOSIS — R06.00: Primary | ICD-10-CM

## 2020-01-22 ENCOUNTER — HOSPITAL ENCOUNTER (EMERGENCY)
Dept: HOSPITAL 1 - ED | Age: 35
Discharge: HOME | End: 2020-01-22
Payer: COMMERCIAL

## 2020-01-22 VITALS — BODY MASS INDEX: 24.35 KG/M2 | WEIGHT: 124 LBS | HEIGHT: 60 IN

## 2020-01-22 VITALS — DIASTOLIC BLOOD PRESSURE: 99 MMHG | SYSTOLIC BLOOD PRESSURE: 180 MMHG

## 2020-01-22 DIAGNOSIS — K52.9: Primary | ICD-10-CM

## 2020-01-22 LAB
ALBUMIN SERPL-MCNC: 3.9 G/DL (ref 3.4–5)
ALP SERPL-CCNC: 92 U/L (ref 46–116)
ALT SERPL-CCNC: 16 U/L (ref 14–59)
AST SERPL-CCNC: 8 U/L (ref 15–37)
BASOPHILS NFR BLD: 0.1 % (ref 0–2)
BILIRUB SERPL-MCNC: 0.5 MG/DL (ref 0.2–1)
BUN SERPL-MCNC: 88 MG/DL (ref 7–18)
CALCIUM SERPL-MCNC: 9.8 MG/DL (ref 8.5–10.1)
CHLORIDE SERPL-SCNC: 94 MMOL/L (ref 98–107)
CO2 SERPL-SCNC: 29.9 MMOL/L (ref 21–32)
CREAT SERPL-MCNC: 12.9 MG/DL (ref 0.6–1)
ERYTHROCYTE [DISTWIDTH] IN BLOOD BY AUTOMATED COUNT: 15.1 % (ref 11.5–14.5)
GFR SERPLBLD BASED ON 1.73 SQ M-ARVRAT: 3 ML/MIN
GLUCOSE SERPL-MCNC: 94 MG/DL (ref 74–106)
LIPASE SERPL-CCNC: 146 IU/L (ref 73–393)
PLATELET # BLD: 162 X10^3MCL (ref 130–400)
POTASSIUM SERPL-SCNC: 5.1 MMOL/L (ref 3.5–5.1)
PROT SERPL-MCNC: 8.1 G/DL (ref 6.4–8.2)
SODIUM SERPL-SCNC: 137 MMOL/L (ref 136–145)

## 2020-08-13 ENCOUNTER — HOSPITAL ENCOUNTER (EMERGENCY)
Dept: HOSPITAL 26 - MED | Age: 35
Discharge: HOME | End: 2020-08-13
Payer: COMMERCIAL

## 2020-08-13 VITALS — HEIGHT: 58 IN | BODY MASS INDEX: 24.67 KG/M2 | WEIGHT: 117.5 LBS

## 2020-08-13 VITALS — DIASTOLIC BLOOD PRESSURE: 155 MMHG | SYSTOLIC BLOOD PRESSURE: 247 MMHG

## 2020-08-13 VITALS — SYSTOLIC BLOOD PRESSURE: 130 MMHG | DIASTOLIC BLOOD PRESSURE: 65 MMHG

## 2020-08-13 DIAGNOSIS — Z91.15: ICD-10-CM

## 2020-08-13 DIAGNOSIS — Z79.899: ICD-10-CM

## 2020-08-13 DIAGNOSIS — I10: ICD-10-CM

## 2020-08-13 DIAGNOSIS — N28.9: ICD-10-CM

## 2020-08-13 DIAGNOSIS — M79.601: Primary | ICD-10-CM

## 2020-08-13 LAB
BASOPHILS # BLD AUTO: 0.1 K/UL (ref 0–0.22)
BASOPHILS NFR BLD AUTO: 0.8 % (ref 0–2)
EOSINOPHIL # BLD AUTO: 0.2 K/UL (ref 0–0.4)
EOSINOPHIL NFR BLD AUTO: 2.5 % (ref 0–4)
ERYTHROCYTE [DISTWIDTH] IN BLOOD BY AUTOMATED COUNT: 14.9 % (ref 11.6–13.7)
HCT VFR BLD AUTO: 29.3 % (ref 36–48)
HGB BLD-MCNC: 9.8 G/DL (ref 12–16)
LYMPHOCYTES # BLD AUTO: 1.6 K/UL (ref 2.5–16.5)
LYMPHOCYTES NFR BLD AUTO: 21.5 % (ref 20.5–51.1)
MCH RBC QN AUTO: 35 PG (ref 27–31)
MCHC RBC AUTO-ENTMCNC: 33 G/DL (ref 33–37)
MCV RBC AUTO: 103.1 FL (ref 80–94)
MONOCYTES # BLD AUTO: 0.5 K/UL (ref 0.8–1)
MONOCYTES NFR BLD AUTO: 6.2 % (ref 1.7–9.3)
NEUTROPHILS # BLD AUTO: 5.3 K/UL (ref 1.8–7.7)
NEUTROPHILS NFR BLD AUTO: 69 % (ref 42.2–75.2)
PLATELET # BLD AUTO: 180 K/UL (ref 140–450)
PROTHROMBIN TIME: 10.1 SECS (ref 10.8–13.4)
RBC # BLD AUTO: 2.84 MIL/UL (ref 4.2–5.4)
WBC # BLD AUTO: 7.7 K/UL (ref 4.8–10.8)

## 2020-08-13 PROCEDURE — 84484 ASSAY OF TROPONIN QUANT: CPT

## 2020-08-13 PROCEDURE — 85025 COMPLETE CBC W/AUTO DIFF WBC: CPT

## 2020-08-13 PROCEDURE — 96375 TX/PRO/DX INJ NEW DRUG ADDON: CPT

## 2020-08-13 PROCEDURE — 36415 COLL VENOUS BLD VENIPUNCTURE: CPT

## 2020-08-13 PROCEDURE — 93005 ELECTROCARDIOGRAM TRACING: CPT

## 2020-08-13 PROCEDURE — 85610 PROTHROMBIN TIME: CPT

## 2020-08-13 PROCEDURE — 85730 THROMBOPLASTIN TIME PARTIAL: CPT

## 2020-08-13 PROCEDURE — 99284 EMERGENCY DEPT VISIT MOD MDM: CPT

## 2020-08-13 PROCEDURE — 96374 THER/PROPH/DIAG INJ IV PUSH: CPT

## 2020-08-13 NOTE — NUR
Patient discharged with v/s stable. Written and verbal after care instructions 
given and explained. 

Patient verbalized understanding. Ambulatory with steady gait. All questions 
addressed prior to discharge. Advised to follow up with PMD. DR. GARCIA HAS 
INSTRUCTED PT TO GO TO DIALYSIS. PT WILL BE PICKED UP BY .

## 2020-08-13 NOTE — NUR
34/F C/O RIGHT ARM PAIN AND BRUISING X 2 DAYS. STATES PAIN AND BRUISING STARTED 
AFTER BP CUFF USED ON ARM. DENIES OTHER INJURIES. PT HYPERTENSIVE 247/155 AT 
[facility]. PAIN TAKEN ON CALF DUE TO RIGHT ARM PAIN AND LEFT ARM AV FISTULA. 
STATES TOOK CLONIDINE LESS THAN 1 HOUR PTA. MISSED HEMODIALYSIS YESTERDAY DUE 
TO SLEEPING IN AND TAKING CARE OF CHILDREN. ANURIC X 1 YEAR. APPEARS NAD. 





 med hx: HTN, ESRD WITH HEMODIAYSIS M/W/F

## 2020-08-13 NOTE — NUR
Pt c/o left chest pressure/pain sensation and SOB. 100%RA. Noted restless legs. 
Pt states she does have hx of anxiety. Dr. Ceballos notified. 

-------------------------------------------------------------------------------

Addendum: 08/13/20 at 0905 by ALLEGRA

-------------------------------------------------------------------------------

x 10 min

## 2020-08-13 NOTE — NUR
-------------------------------------------------------------------------------

           *** Note undone in [facility] - 08/13/20 at 1029 by ALLEGRA ***            

-------------------------------------------------------------------------------

DR. GARCIA SPEAKING W/ PT AT BEDSIDE

## 2020-08-13 NOTE — NUR
DR. GARCIA EVALUATING PT AT BEDSIDE 

-------------------------------------------------------------------------------

Addendum: 08/13/20 at 1021 by ALLEGRA

-------------------------------------------------------------------------------

RE-EVALUATING

## 2020-08-13 NOTE — NUR
ATTEMPTED TO OBTAIN EKG ON PT. PT VERY RESTLESS. WOULD NOT STOP MOVING. SHAKING 
THE LEGS AND SITTING UP AND DOWN. STATES UNCOMFORTABLE WHEN LYING STILL. AOX4. 
INFORMED PT ONLY NEEDS TO LIE STILL FOR A MINUTE TO OBTAIN EKG. PT UNABLE TO 
LIE STILL.